# Patient Record
Sex: FEMALE | Race: BLACK OR AFRICAN AMERICAN | NOT HISPANIC OR LATINO | URBAN - METROPOLITAN AREA
[De-identification: names, ages, dates, MRNs, and addresses within clinical notes are randomized per-mention and may not be internally consistent; named-entity substitution may affect disease eponyms.]

---

## 2017-08-17 ENCOUNTER — EMERGENCY (EMERGENCY)
Facility: HOSPITAL | Age: 67
LOS: 1 days | Discharge: HOME | End: 2017-08-17
Attending: EMERGENCY MEDICINE | Admitting: EMERGENCY MEDICINE
Payer: MEDICARE

## 2017-08-17 VITALS
OXYGEN SATURATION: 100 % | SYSTOLIC BLOOD PRESSURE: 180 MMHG | RESPIRATION RATE: 20 BRPM | TEMPERATURE: 98 F | DIASTOLIC BLOOD PRESSURE: 100 MMHG | HEART RATE: 88 BPM

## 2017-08-17 VITALS
HEART RATE: 64 BPM | SYSTOLIC BLOOD PRESSURE: 161 MMHG | DIASTOLIC BLOOD PRESSURE: 104 MMHG | RESPIRATION RATE: 16 BRPM | OXYGEN SATURATION: 100 %

## 2017-08-17 DIAGNOSIS — Z95.0 PRESENCE OF CARDIAC PACEMAKER: Chronic | ICD-10-CM

## 2017-08-17 LAB
ALBUMIN SERPL ELPH-MCNC: 4.6 G/DL — SIGNIFICANT CHANGE UP (ref 3.3–5)
ALP SERPL-CCNC: 106 U/L — SIGNIFICANT CHANGE UP (ref 40–120)
ALT FLD-CCNC: 13 U/L — SIGNIFICANT CHANGE UP (ref 4–33)
AMPHET UR-MCNC: NEGATIVE — SIGNIFICANT CHANGE UP
APAP SERPL-MCNC: < 15 UG/ML — LOW (ref 15–25)
APPEARANCE UR: CLEAR — SIGNIFICANT CHANGE UP
AST SERPL-CCNC: 17 U/L — SIGNIFICANT CHANGE UP (ref 4–32)
BARBITURATES MEASUREMENT: NEGATIVE — SIGNIFICANT CHANGE UP
BARBITURATES UR SCN-MCNC: NEGATIVE — SIGNIFICANT CHANGE UP
BASOPHILS # BLD AUTO: 0.03 K/UL — SIGNIFICANT CHANGE UP (ref 0–0.2)
BASOPHILS NFR BLD AUTO: 0.4 % — SIGNIFICANT CHANGE UP (ref 0–2)
BENZODIAZ SERPL-MCNC: NEGATIVE — SIGNIFICANT CHANGE UP
BENZODIAZ UR-MCNC: NEGATIVE — SIGNIFICANT CHANGE UP
BILIRUB SERPL-MCNC: 0.7 MG/DL — SIGNIFICANT CHANGE UP (ref 0.2–1.2)
BILIRUB UR-MCNC: NEGATIVE — SIGNIFICANT CHANGE UP
BLOOD UR QL VISUAL: NEGATIVE — SIGNIFICANT CHANGE UP
BUN SERPL-MCNC: 8 MG/DL — SIGNIFICANT CHANGE UP (ref 7–23)
CALCIUM SERPL-MCNC: 10.9 MG/DL — HIGH (ref 8.4–10.5)
CANNABINOIDS UR-MCNC: NEGATIVE — SIGNIFICANT CHANGE UP
CHLORIDE SERPL-SCNC: 103 MMOL/L — SIGNIFICANT CHANGE UP (ref 98–107)
CO2 SERPL-SCNC: 27 MMOL/L — SIGNIFICANT CHANGE UP (ref 22–31)
COCAINE METAB.OTHER UR-MCNC: NEGATIVE — SIGNIFICANT CHANGE UP
COLOR SPEC: SIGNIFICANT CHANGE UP
CREAT SERPL-MCNC: 0.79 MG/DL — SIGNIFICANT CHANGE UP (ref 0.5–1.3)
EOSINOPHIL # BLD AUTO: 0.18 K/UL — SIGNIFICANT CHANGE UP (ref 0–0.5)
EOSINOPHIL NFR BLD AUTO: 2.2 % — SIGNIFICANT CHANGE UP (ref 0–6)
ETHANOL BLD-MCNC: < 10 MG/DL — SIGNIFICANT CHANGE UP
GLUCOSE SERPL-MCNC: 96 MG/DL — SIGNIFICANT CHANGE UP (ref 70–99)
GLUCOSE UR-MCNC: NEGATIVE — SIGNIFICANT CHANGE UP
HCT VFR BLD CALC: 42.8 % — SIGNIFICANT CHANGE UP (ref 34.5–45)
HGB BLD-MCNC: 13.1 G/DL — SIGNIFICANT CHANGE UP (ref 11.5–15.5)
IMM GRANULOCYTES # BLD AUTO: 0.03 # — SIGNIFICANT CHANGE UP
IMM GRANULOCYTES NFR BLD AUTO: 0.4 % — SIGNIFICANT CHANGE UP (ref 0–1.5)
KETONES UR-MCNC: NEGATIVE — SIGNIFICANT CHANGE UP
LEUKOCYTE ESTERASE UR-ACNC: NEGATIVE — SIGNIFICANT CHANGE UP
LYMPHOCYTES # BLD AUTO: 2.37 K/UL — SIGNIFICANT CHANGE UP (ref 1–3.3)
LYMPHOCYTES # BLD AUTO: 28.7 % — SIGNIFICANT CHANGE UP (ref 13–44)
MCHC RBC-ENTMCNC: 27.8 PG — SIGNIFICANT CHANGE UP (ref 27–34)
MCHC RBC-ENTMCNC: 30.6 % — LOW (ref 32–36)
MCV RBC AUTO: 90.9 FL — SIGNIFICANT CHANGE UP (ref 80–100)
METHADONE UR-MCNC: NEGATIVE — SIGNIFICANT CHANGE UP
MONOCYTES # BLD AUTO: 0.53 K/UL — SIGNIFICANT CHANGE UP (ref 0–0.9)
MONOCYTES NFR BLD AUTO: 6.4 % — SIGNIFICANT CHANGE UP (ref 2–14)
MUCOUS THREADS # UR AUTO: SIGNIFICANT CHANGE UP
NEUTROPHILS # BLD AUTO: 5.12 K/UL — SIGNIFICANT CHANGE UP (ref 1.8–7.4)
NEUTROPHILS NFR BLD AUTO: 61.9 % — SIGNIFICANT CHANGE UP (ref 43–77)
NITRITE UR-MCNC: NEGATIVE — SIGNIFICANT CHANGE UP
NRBC # FLD: 0.02 — SIGNIFICANT CHANGE UP
OPIATES UR-MCNC: NEGATIVE — SIGNIFICANT CHANGE UP
OXYCODONE UR-MCNC: NEGATIVE — SIGNIFICANT CHANGE UP
PCP UR-MCNC: NEGATIVE — SIGNIFICANT CHANGE UP
PH UR: 7 — SIGNIFICANT CHANGE UP (ref 4.6–8)
PLATELET # BLD AUTO: 193 K/UL — SIGNIFICANT CHANGE UP (ref 150–400)
PMV BLD: 9.6 FL — SIGNIFICANT CHANGE UP (ref 7–13)
POTASSIUM SERPL-MCNC: 4.7 MMOL/L — SIGNIFICANT CHANGE UP (ref 3.5–5.3)
POTASSIUM SERPL-SCNC: 4.7 MMOL/L — SIGNIFICANT CHANGE UP (ref 3.5–5.3)
PROT SERPL-MCNC: 8.3 G/DL — SIGNIFICANT CHANGE UP (ref 6–8.3)
PROT UR-MCNC: NEGATIVE — SIGNIFICANT CHANGE UP
RBC # BLD: 4.71 M/UL — SIGNIFICANT CHANGE UP (ref 3.8–5.2)
RBC # FLD: 14.2 % — SIGNIFICANT CHANGE UP (ref 10.3–14.5)
RBC CASTS # UR COMP ASSIST: SIGNIFICANT CHANGE UP (ref 0–?)
SALICYLATES SERPL-MCNC: < 5 MG/DL — LOW (ref 15–30)
SODIUM SERPL-SCNC: 143 MMOL/L — SIGNIFICANT CHANGE UP (ref 135–145)
SP GR SPEC: 1.01 — SIGNIFICANT CHANGE UP (ref 1–1.03)
SQUAMOUS # UR AUTO: SIGNIFICANT CHANGE UP
TSH SERPL-MCNC: 1.36 UIU/ML — SIGNIFICANT CHANGE UP (ref 0.27–4.2)
UROBILINOGEN FLD QL: NORMAL E.U. — SIGNIFICANT CHANGE UP (ref 0.1–0.2)
WBC # BLD: 8.26 K/UL — SIGNIFICANT CHANGE UP (ref 3.8–10.5)
WBC # FLD AUTO: 8.26 K/UL — SIGNIFICANT CHANGE UP (ref 3.8–10.5)
WBC UR QL: SIGNIFICANT CHANGE UP (ref 0–?)

## 2017-08-17 PROCEDURE — 99285 EMERGENCY DEPT VISIT HI MDM: CPT | Mod: 25

## 2017-08-17 PROCEDURE — 93010 ELECTROCARDIOGRAM REPORT: CPT

## 2017-08-17 PROCEDURE — 90792 PSYCH DIAG EVAL W/MED SRVCS: CPT

## 2017-08-17 NOTE — ED BEHAVIORAL HEALTH ASSESSMENT NOTE - HPI (INCLUDE ILLNESS QUALITY, SEVERITY, DURATION, TIMING, CONTEXT, MODIFYING FACTORS, ASSOCIATED SIGNS AND SYMPTOMS)
This is a 66 year old  -Amercian male This is a 66 year old  -Amercian male, domiciled in an [facility] supportive apartment This is a 66 year old  -Amercian male, domiciled in an [facility] supportive apartment in [place], history of multiple past psychiatric hospitalizations most recently discharged from [facility] in June 2017 where patient received ECT treatment during inpatient course, history of past suicide attempt 30 years ago with past history of suicidal ideation, engaged in outpatient treatment at [facility] with reported medication adherance, history of Lymphoma, HTN, pacemaker, umbilical hernia is brought to the ER by ambulance called by [facility] staff This is a 66 year old  -Amercian male, domiciled in an [facility] supportive apartment in [place], history of multiple past psychiatric hospitalizations most recently discharged from [facility] in June 2017 where patient received ECT treatment during inpatient course, history of past suicide attempt 30 years ago with past history of suicidal ideation, engaged in outpatient treatment at [facility] with reported medication adherance, history of Lymphoma, HTN, pacemaker, umbilical hernia is brought to the ER by ambulance called by [facility] staff due to suicidal ideation.  Patient does endorse long history of chronic depressive symptoms and reports episodic passive suicidal ideation stating "there are days I feel this way." This is a 66 year old  -Amercian male, domiciled in an [facility] supportive apartment in [place], history of multiple past psychiatric hospitalizations most recently discharged from [facility] in June 2017 where patient received ECT treatment during inpatient course, history of past suicide attempt 30 years ago with past history of suicidal ideation, engaged in outpatient treatment at [facility] with reported medication adherance, history of Lymphoma, HTN, pacemaker, umbilical hernia is brought to the ER by ambulance called by [facility] staff due to suicidal ideation.  Patient does endorse long history of chronic depressive symptoms and reports episodic passive suicidal ideation stating "there are days I feel this way."    See  note as completed by Brooklyn Mauricio from social work for collateral and additional information obtained. This is a 66 year old  -Amercian male, domiciled in an [facility] supportive apartment in [place], history of multiple past psychiatric hospitalizations most recently discharged from [facility] in June 2017 where patient received ECT treatment during inpatient course, history of one past suicide attempt 30 years ago with past history of suicidal ideation, engaged in outpatient treatment at [facility] with reported medication adherance, history of Lymphoma, HTN, pacemaker, umbilical hernia is brought to the ER by ambulance called by [facility] staff due to suicidal ideation.  Patient does endorse long history of chronic depressive symptoms and reports episodic passive suicidal ideation stating "there are days I feel this way," but denies acute suicidal ideation, either active or passive at this time.  Patient does endorse depressed mood with low energy, anhedonia, low interest but denies feelings of hopelessness or helplessness at this time.  Patient reports fair appetite with good sleep with patient reporting benefit from Trazodone and sleeping approximately 7 hours per night.  Patient feeling anger directed at being sent to the ER for evaluation after scheduled appointment with  stating, "ironically, today was one of my better days."  Patient endorses multiple psychosocial stressors including getting adjusted to new living environment and roommate with patient moving into supportive apartment residence about 5 weeks ago.  Additionally patient express frustration with her stove in apartment not working and financial stressors stemming from medicaid issues and paying her rent.  Patient reports she attempts to be consistent with a daily routine schedule and also utilizes journaling as coping strategies which she reports to have been helpful.  Patient is future oriented and states she enjoys seeing her son and granddaughter on weekends and reports her sister is also a positive support for her.  Patient with a reported history of alcohol abuse in the past but denies any recent alcohol use or illicit substance use or abuse.  No homicidal ideation or violent thoughts, no acute anxiety symptoms or panic attacks, no evidence of irritability, no acute agitation, no manic/hypomanic symptoms, no lability, no AH, no VH, no paranoia, no delusions, no evidence of PTSD or OCD symptoms.  Patient reports medication adherance with psychiatric medications and treatment and able to contract for safety at this time.  Patient does not present as an acute risk to self or others at this time.    See  note as completed by Brooklyn Mauricio from social work for collateral and additional information obtained. This is a 66 year old   Amercian female, domiciled in an [facility] supportive apartment in [place], history of multiple past psychiatric hospitalizations most recently discharged from [facility] in June 2017 where patient received ECT treatment during inpatient course, history of one past suicide attempt 30 years ago with past history of suicidal ideation, engaged in outpatient treatment at [facility] with reported medication adherance, history of Hodgkin's Lymphoma, HTN, GERD, pacemaker, umbilical hernia is brought to the ER by ambulance called by [facility] staff due to suicidal ideation.  Patient does endorse long history of chronic depressive symptoms and reports episodic passive suicidal ideation stating "there are days I feel this way," but denies acute suicidal ideation, either active or passive at this time.  Patient does endorse depressed mood with low energy, anhedonia, low interest but denies feelings of hopelessness or helplessness at this time.  Patient reports fair appetite with good sleep with patient reporting benefit from Trazodone and sleeping approximately 7 hours per night.  Patient expresses feeling anger directed at being sent to the ER for evaluation after scheduled appointment with  stating, "ironically, today was one of my better days."  Patient endorses multiple psychosocial stressors including getting adjusted to new living environment and roommate with patient moving into supportive apartment residence about 5 weeks ago.  Additionally patient express frustration with her stove in the apartment not working and financial stressors stemming from medicaid issues and paying her rent.  Patient reports she attempts to be consistent with a daily routine schedule and also utilizes journaling as coping strategies which she reports to have been helpful.  Patient is future oriented and states she enjoys seeing her son and granddaughter on weekends and reports her sister is also a positive support for her which are significant protective factors.  Patient with a reported history of alcohol abuse in the past but denies any recent alcohol use or illicit substance use or abuse with toxicology screens noted to be negative.  No homicidal ideation or violent thoughts, no acute anxiety symptoms or panic attacks, no evidence of irritability, no acute agitation, no manic/hypomanic symptoms, no lability, no AH, no VH, no paranoia, no delusions, no evidence of PTSD or OCD symptoms.  Patient reports medication adherance with psychiatric medications and treatment and able to contract for safety at this time.  Patient does not present as an acute risk to self or others at this time with no acute signs or symptoms of dangerousness noted.    See  note as completed by Brooklyn Mauricio from social work for collateral and additional information obtained.

## 2017-08-17 NOTE — ED BEHAVIORAL HEALTH ASSESSMENT NOTE - DESCRIPTION
calm, cooperative, no psychomotor abnormalities calm, cooperative, no psychomotor abnormalities, no prns required during ED course Hodgkin's Lymphoma by history, HTN, GERD, hx of post-inflammatory pulmonary fibrosis, hx of blood clots Born and raised in [place], high school graduate with one year of college,  15 years ago, has 2 sons, estranged from 38yo but close relationship with 33yo son, 12 yo granddaughter who lives in [place], [place] calm, cooperative, pleasant, no psychomotor abnormalities, no prns required during ED course

## 2017-08-17 NOTE — ED PROVIDER NOTE - PROGRESS NOTE DETAILS
Elaine: s/o Dr. Ascencio, pt with depression, has a h/o ppm so s/o pending ecg. he has no concern for intox. labs normal. ecg is paced w/o any intox changes.  med clear.

## 2017-08-17 NOTE — ED ADULT NURSE NOTE - OBJECTIVE STATEMENT
pt received in , c/o depression, pt verbalizes occasional feelings of sadness, as per pt "I told my  I don't have plans of hurting myself, just that sometimes when I wake up in the morning I wish I didn't wake up"  pt denies SI,HI&AH, denies ETOH and substance use. A&Ox3. Awaiting psych eval.
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
## 2017-08-17 NOTE — ED PROVIDER NOTE - MEDICAL DECISION MAKING DETAILS
66 hx of depression, pending medical clearance, psych will eval, s/o to dr moore check labs, dispo accordingly

## 2017-08-17 NOTE — ED BEHAVIORAL HEALTH ASSESSMENT NOTE - MEDICATIONS (PRESCRIPTIONS, DIRECTIONS)
Continue medications as prescribed, no changes, adherance is advised and encouraged, patient expresses agreement with stated plan.

## 2017-08-17 NOTE — ED BEHAVIORAL HEALTH ASSESSMENT NOTE - SUMMARY
This is a 66 year old  -Amercian male, domiciled in an [facility] supportive apartment in [place], history of multiple past psychiatric hospitalizations most recently discharged from [facility] in June 2017 where patient received ECT treatment during inpatient course, history of one past suicide attempt 30 years ago with past history of suicidal ideation, engaged in outpatient treatment at [facility] with reported medication adherance, history of Lymphoma, HTN, pacemaker, umbilical hernia is brought to the ER by ambulance called by [facility] staff due to suicidal ideation.  Patient does endorse long history of chronic depressive symptoms and reports episodic passive suicidal ideation stating "there are days I feel this way," but denies acute suicidal ideation, either active or passive at this time.  Patient does endorse depressed mood with low energy, anhedonia, low interest but denies feelings of hopelessness or helplessness at this time.  Patient reports fair appetite with good sleep with patient reporting benefit from Trazodone and sleeping approximately 7 hours per night.  Patient feeling anger directed at being sent to the ER for evaluation after scheduled appointment with  stating, "ironically, today was one of my better days."  Patient endorses multiple psychosocial stressors including getting adjusted to new living environment and roommate with patient moving into supportive apartment residence about 5 weeks ago.  Additionally patient express frustration with her stove in apartment not working and financial stressors stemming from medicaid issues and paying her rent.  Patient reports she attempts to be consistent with a daily routine schedule and also utilizes journaling as coping strategies which she reports to have been helpful.  Patient is future oriented and states she enjoys seeing her son and granddaughter on weekends and reports her sister is also a positive support for her.  Patient with a reported history of alcohol abuse in the past but denies any recent alcohol use or illicit substance use or abuse.  No homicidal ideation or violent thoughts, no acute anxiety symptoms or panic attacks, no evidence of irritability, no acute agitation, no manic/hypomanic symptoms, no lability, no AH, no VH, no paranoia, no delusions, no evidence of PTSD or OCD symptoms.  Patient reports medication adherance with psychiatric medications and treatment and able to contract for safety at this time.  Patient does not present as an acute risk to self or others at this time. This is a 66 year old  -Amercian female, domiciled in an [facility] supportive apartment in [place], history of multiple past psychiatric hospitalizations most recently discharged from [facility] in June 2017, history of one past suicide attempt 30 years ago with past history of suicidal ideation, engaged in outpatient treatment at [facility] with reported medication adherance, history of Lymphoma, HTN, pacemaker, umbilical hernia is brought to the ER by ambulance called by [facility] staff due to suicidal ideation.  Patient does endorse long history of chronic depressive symptoms and reports episodic passive suicidal ideation but denies acute suicidal ideation, either active or passive at this time.  Patient does endorse depressed mood with low energy, anhedonia, low interest but denies feelings of hopelessness or helplessness at this time.  Patient reports fair appetite with good sleep with patient reporting benefit from Trazodone.  Patient expresses feeling anger directed at being sent to the ER for evaluation but no overt irritability or agitation is evident. Patient endorses multiple psychosocial stressors including financial, getting adjusted to new living environment and roommate with patient moving into supportive apartment residence about 5 weeks ago.  Patient reports she attempts to be consistent with a daily routine schedule and also utilizes journaling as coping strategies which she reports to have been helpful.  Patient is future oriented and states she enjoys seeing her son and granddaughter on weekends and reports her sister is also a positive support for her.  Patient with a reported history of alcohol abuse but denies any recent alcohol use or illicit substance use or abuse.  No homicidal ideation or violent thoughts, no acute anxiety symptoms or panic attacks, no evidence of irritability, no acute agitation, no manic/hypomanic symptoms, no lability, no AH, no VH, no paranoia, no delusions, no evidence of PTSD or OCD symptoms.  Patient reports medication adherance with psychiatric medications and treatment and able to contract for safety at this time.  Patient does not present as an acute risk to self or others at this time and is noted to be fully cooperative and disclosing, open and expressive with the evaluation performed.  There is no evidence of evasiveness exhibited during the course of the evaluation.

## 2017-08-17 NOTE — ED BEHAVIORAL HEALTH ASSESSMENT NOTE - SUICIDE PROTECTIVE FACTORS
Supportive social network or family/Future oriented/Identifies reasons for living/Positive therapeutic relationships

## 2017-08-17 NOTE — ED PROVIDER NOTE - CHPI ED SYMPTOMS NEG
no weakness/no hallucinations/no change in level of consciousness/no homicidal/no confusion/no suicidal/no disorientation/no agitation/no paranoia

## 2017-08-17 NOTE — ED ADULT TRIAGE NOTE - CHIEF COMPLAINT QUOTE
Pt sent in by her  because she states that when she wakes  up in the morning she wishes that she would not. Pt denies being suicidal in triage  and is upset she was sent in her. Pt with bipolar and depression .  is  anat andino

## 2017-08-17 NOTE — ED PROVIDER NOTE - OBJECTIVE STATEMENT
67 yo female w hx of depression , on zoloft, wellbutrin and trazodone, as well as another psych med, was talking to  today, told her feels depressed, going through "lots of stuff"  new apartment, stove not working,  called ems.  pt denies SI HI delusion hallucinations.  previously had ECT  hx of htn ppm sp bradycardia, hld

## 2017-08-17 NOTE — ED BEHAVIORAL HEALTH ASSESSMENT NOTE - OTHER
roommate Tiffany Chaudhary,  staff from [facility] supportive apartment program Provided support, reassurance and psychoeducation.

## 2017-08-17 NOTE — ED BEHAVIORAL HEALTH ASSESSMENT NOTE - NS ED BHA PLAN TR BH CONTACTED FT
call and message left for Dr. Bowers, outpatient psychiatrist, but not available directly as after hours

## 2017-08-17 NOTE — ED BEHAVIORAL HEALTH ASSESSMENT NOTE - RISK ASSESSMENT
Chronic risk factors are noted including history of Major depressive disorder with chronic depressive symptoms and past suicidal ideation, one past suicide attempt in the past, multiple past psychiatric hospitalizations.  Precipitating factors seem to be recent change in residence and financial stressors.  Protective factors include positive therapeutic relationships, supportive relationships with family and future oriented thinking related to seeing granddaughter in the future and reported medication adherance.  Patient represents a chronic risk to self but as there is no acute SI, there is no evidence of an acute risk to self or others.  Patient is offered voluntary psychiatric hospitalization and declines, does not meet criteria for involuntary psychiatric retention as per parameters of mental health hygiene law 9.39.

## 2017-08-17 NOTE — ED BEHAVIORAL HEALTH ASSESSMENT NOTE - SAFETY PLAN DETAILS
Call 911 or immediately return to ER if patient should endorse suicidal ideation or feel self to be a risk to self or others.  Patient advised to contact staff immediately as well and agrees.

## 2017-08-17 NOTE — ED BEHAVIORAL HEALTH ASSESSMENT NOTE - PATIENT'S CHIEF COMPLAINT
"There are days I have thoughts I wish I wouldn't wake up but today was actually was one of my better days."
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
## 2017-08-17 NOTE — ED BEHAVIORAL HEALTH ASSESSMENT NOTE - OTHER PAST PSYCHIATRIC HISTORY (INCLUDE DETAILS REGARDING ONSET, COURSE OF ILLNESS, INPATIENT/OUTPATIENT TREATMENT)
History of Major Depressive Disorder with outpatient treatment at [facility] outpatient clinic since 2012, seeing Dr. Bowers, outpatient psychiatrist for the last 2-3 years, no current therapist in place.  3 past inpatient psychiatric hospitalizations at [facility], first in 2009 and most recently discharged in June 2017 after 2 month course with first ECT treatment given during that time.  One past history of suicide attempt by slitting wrists 30 years, no history of self injurious behaviors.

## 2017-08-17 NOTE — ED BEHAVIORAL HEALTH ASSESSMENT NOTE - REFERRAL / APPOINTMENT DETAILS
follow up with outpatient psychiatrist, Dr. Bowers at [facility] with patient to contact for appointment tomorrow, care is coordinated with , Tiffany Chaudhary.  Patient provided [facility] crisis clinic brochure and advised of availability from Monday to Friday, 9am-7pm.

## 2017-08-18 DIAGNOSIS — R69 ILLNESS, UNSPECIFIED: ICD-10-CM

## 2017-08-18 DIAGNOSIS — F33.9 MAJOR DEPRESSIVE DISORDER, RECURRENT, UNSPECIFIED: ICD-10-CM

## 2023-12-15 NOTE — ED ADULT NURSE NOTE - NS ED NURSE DISCH DISPOSITION
Completed and placed in the [facility] fax bin.
Faxed.
Left a message on Theresa's voicemail to call back to the nurse line to discuss the [facility] paperwork that her  dropped off.
Noted. No soon availability unfortunately. If outpatient therapy office could just send me a brief note that she indeed started attending and the treatment plan, that would be great. I will be able to fill out the FMLA in that case. Most recent documentation was from a year ago and she was doing well at that time, so there wasn't anything recent on my end that appeared to support the need for continued FMLA.
Patient or  dropped off FMLA paperwork. Previously this was completed for postpartum depression. I last spoke to patient about this in December 2022. At that time her condition was significantly improved. We did not need to use any medications for mood. Unless there is been some recurrence, I do not see a medical reason that would support FMLA being continued at this time. If medical problem would return in the future I would fill this out again.
Received fax from [facility]. Scanned in to patient's chart.
Theresa called back and stated that she is experiencing chronic depression. She has been feeling hopeless and helpless and has had a few dark thoughts. She is currently being seen at [address] [place] [address]. She started there last week. She assumed that they sent you information on this. She would like to see you as soon as you have availability to discuss this and complete the [facility] Paperwork.
Discharged

## 2024-06-13 PROBLEM — E78.5 HYPERLIPIDEMIA, UNSPECIFIED: Chronic | Status: ACTIVE | Noted: 2017-08-17

## 2024-06-13 PROBLEM — I10 ESSENTIAL (PRIMARY) HYPERTENSION: Chronic | Status: ACTIVE | Noted: 2017-08-17

## 2024-06-14 VITALS
HEART RATE: 45 BPM | TEMPERATURE: 98 F | RESPIRATION RATE: 16 BRPM | WEIGHT: 211.64 LBS | OXYGEN SATURATION: 98 % | SYSTOLIC BLOOD PRESSURE: 170 MMHG | HEIGHT: 66 IN | DIASTOLIC BLOOD PRESSURE: 81 MMHG

## 2024-06-18 ENCOUNTER — INPATIENT (INPATIENT)
Facility: HOSPITAL | Age: 74
LOS: 0 days | Discharge: HOME | DRG: 322 | End: 2024-06-19
Attending: INTERNAL MEDICINE | Admitting: INTERNAL MEDICINE
Payer: COMMERCIAL

## 2024-06-18 DIAGNOSIS — Z90.710 ACQUIRED ABSENCE OF BOTH CERVIX AND UTERUS: Chronic | ICD-10-CM

## 2024-06-18 DIAGNOSIS — Z98.890 OTHER SPECIFIED POSTPROCEDURAL STATES: Chronic | ICD-10-CM

## 2024-06-18 DIAGNOSIS — Z95.0 PRESENCE OF CARDIAC PACEMAKER: Chronic | ICD-10-CM

## 2024-06-18 LAB
A1C WITH ESTIMATED AVERAGE GLUCOSE RESULT: 5.2 % — SIGNIFICANT CHANGE UP (ref 4–5.6)
ALBUMIN SERPL ELPH-MCNC: 4.2 G/DL — SIGNIFICANT CHANGE UP (ref 3.3–5)
ALP SERPL-CCNC: 102 U/L — SIGNIFICANT CHANGE UP (ref 40–120)
ALT FLD-CCNC: 9 U/L — LOW (ref 10–45)
ANION GAP SERPL CALC-SCNC: 11 MMOL/L — SIGNIFICANT CHANGE UP (ref 5–17)
APTT BLD: 31.7 SEC — SIGNIFICANT CHANGE UP (ref 24.5–35.6)
AST SERPL-CCNC: 18 U/L — SIGNIFICANT CHANGE UP (ref 10–40)
BASOPHILS # BLD AUTO: 0.04 K/UL — SIGNIFICANT CHANGE UP (ref 0–0.2)
BASOPHILS NFR BLD AUTO: 0.5 % — SIGNIFICANT CHANGE UP (ref 0–2)
BILIRUB SERPL-MCNC: 0.7 MG/DL — SIGNIFICANT CHANGE UP (ref 0.2–1.2)
BUN SERPL-MCNC: 14 MG/DL — SIGNIFICANT CHANGE UP (ref 7–23)
CALCIUM SERPL-MCNC: 10.8 MG/DL — HIGH (ref 8.4–10.5)
CHLORIDE SERPL-SCNC: 103 MMOL/L — SIGNIFICANT CHANGE UP (ref 96–108)
CHOLEST SERPL-MCNC: 134 MG/DL — SIGNIFICANT CHANGE UP
CK MB CFR SERPL CALC: 2.3 NG/ML — SIGNIFICANT CHANGE UP (ref 0–6.7)
CK SERPL-CCNC: 96 U/L — SIGNIFICANT CHANGE UP (ref 25–170)
CO2 SERPL-SCNC: 27 MMOL/L — SIGNIFICANT CHANGE UP (ref 22–31)
CREAT SERPL-MCNC: 0.88 MG/DL — SIGNIFICANT CHANGE UP (ref 0.5–1.3)
EGFR: 70 ML/MIN/1.73M2 — SIGNIFICANT CHANGE UP
EOSINOPHIL # BLD AUTO: 0.51 K/UL — HIGH (ref 0–0.5)
EOSINOPHIL NFR BLD AUTO: 6 % — SIGNIFICANT CHANGE UP (ref 0–6)
ESTIMATED AVERAGE GLUCOSE: 103 MG/DL — SIGNIFICANT CHANGE UP (ref 68–114)
GLUCOSE SERPL-MCNC: 95 MG/DL — SIGNIFICANT CHANGE UP (ref 70–99)
HCT VFR BLD CALC: 36.9 % — SIGNIFICANT CHANGE UP (ref 34.5–45)
HDLC SERPL-MCNC: 60 MG/DL — SIGNIFICANT CHANGE UP
HGB BLD-MCNC: 11.4 G/DL — LOW (ref 11.5–15.5)
IMM GRANULOCYTES NFR BLD AUTO: 0.2 % — SIGNIFICANT CHANGE UP (ref 0–0.9)
INR BLD: 1.03 — SIGNIFICANT CHANGE UP (ref 0.85–1.18)
LIPID PNL WITH DIRECT LDL SERPL: 62 MG/DL — SIGNIFICANT CHANGE UP
LYMPHOCYTES # BLD AUTO: 1.88 K/UL — SIGNIFICANT CHANGE UP (ref 1–3.3)
LYMPHOCYTES # BLD AUTO: 22 % — SIGNIFICANT CHANGE UP (ref 13–44)
MAGNESIUM SERPL-MCNC: 1.6 MG/DL — SIGNIFICANT CHANGE UP (ref 1.6–2.6)
MCHC RBC-ENTMCNC: 28.2 PG — SIGNIFICANT CHANGE UP (ref 27–34)
MCHC RBC-ENTMCNC: 30.9 GM/DL — LOW (ref 32–36)
MCV RBC AUTO: 91.3 FL — SIGNIFICANT CHANGE UP (ref 80–100)
MONOCYTES # BLD AUTO: 0.73 K/UL — SIGNIFICANT CHANGE UP (ref 0–0.9)
MONOCYTES NFR BLD AUTO: 8.6 % — SIGNIFICANT CHANGE UP (ref 2–14)
NEUTROPHILS # BLD AUTO: 5.35 K/UL — SIGNIFICANT CHANGE UP (ref 1.8–7.4)
NEUTROPHILS NFR BLD AUTO: 62.7 % — SIGNIFICANT CHANGE UP (ref 43–77)
NON HDL CHOLESTEROL: 74 MG/DL — SIGNIFICANT CHANGE UP
NRBC # BLD: 0 /100 WBCS — SIGNIFICANT CHANGE UP (ref 0–0)
PLATELET # BLD AUTO: 175 K/UL — SIGNIFICANT CHANGE UP (ref 150–400)
POTASSIUM SERPL-MCNC: 4.6 MMOL/L — SIGNIFICANT CHANGE UP (ref 3.5–5.3)
POTASSIUM SERPL-SCNC: 4.6 MMOL/L — SIGNIFICANT CHANGE UP (ref 3.5–5.3)
PROT SERPL-MCNC: 7.9 G/DL — SIGNIFICANT CHANGE UP (ref 6–8.3)
PROTHROM AB SERPL-ACNC: 11.7 SEC — SIGNIFICANT CHANGE UP (ref 9.5–13)
RBC # BLD: 4.04 M/UL — SIGNIFICANT CHANGE UP (ref 3.8–5.2)
RBC # FLD: 14.7 % — HIGH (ref 10.3–14.5)
SODIUM SERPL-SCNC: 141 MMOL/L — SIGNIFICANT CHANGE UP (ref 135–145)
TRIGL SERPL-MCNC: 57 MG/DL — SIGNIFICANT CHANGE UP
WBC # BLD: 8.53 K/UL — SIGNIFICANT CHANGE UP (ref 3.8–10.5)
WBC # FLD AUTO: 8.53 K/UL — SIGNIFICANT CHANGE UP (ref 3.8–10.5)

## 2024-06-18 PROCEDURE — 99152 MOD SED SAME PHYS/QHP 5/>YRS: CPT

## 2024-06-18 PROCEDURE — 92978 ENDOLUMINL IVUS OCT C 1ST: CPT | Mod: 26,LD

## 2024-06-18 PROCEDURE — 93010 ELECTROCARDIOGRAM REPORT: CPT | Mod: 76

## 2024-06-18 PROCEDURE — 92933 PRQ TRLML C ATHRC ST ANGIOP1: CPT | Mod: LD

## 2024-06-18 RX ORDER — TRAZODONE HYDROCHLORIDE 50 MG/1
50 TABLET, FILM COATED ORAL AT BEDTIME
Refills: 0 | Status: DISCONTINUED | OUTPATIENT
Start: 2024-06-18 | End: 2024-06-19

## 2024-06-18 RX ORDER — CARIPRAZINE 3 MG/1
1 CAPSULE, GELATIN COATED ORAL
Refills: 0 | DISCHARGE

## 2024-06-18 RX ORDER — DEXTROMETHORPHAN HYDROBROMIDE, BUPROPION HYDROCHLORIDE 105; 45 MG/1; MG/1
1 TABLET, MULTILAYER, EXTENDED RELEASE ORAL
Refills: 0 | DISCHARGE

## 2024-06-18 RX ORDER — TRAZODONE HYDROCHLORIDE 50 MG/1
1 TABLET, FILM COATED ORAL
Refills: 0 | DISCHARGE

## 2024-06-18 RX ORDER — LAMOTRIGINE 100 MG/1
1 TABLET ORAL
Refills: 0 | DISCHARGE

## 2024-06-18 RX ORDER — ATORVASTATIN CALCIUM 20 MG/1
40 TABLET, FILM COATED ORAL AT BEDTIME
Refills: 0 | Status: DISCONTINUED | OUTPATIENT
Start: 2024-06-18 | End: 2024-06-19

## 2024-06-18 RX ORDER — MORPHINE SULFATE 100 MG/1
1 TABLET, EXTENDED RELEASE ORAL ONCE
Refills: 0 | Status: DISCONTINUED | OUTPATIENT
Start: 2024-06-18 | End: 2024-06-18

## 2024-06-18 RX ORDER — ASPIRIN 325 MG/1
81 TABLET, FILM COATED ORAL DAILY
Refills: 0 | Status: DISCONTINUED | OUTPATIENT
Start: 2024-06-19 | End: 2024-06-19

## 2024-06-18 RX ORDER — CLOPIDOGREL BISULFATE 75 MG/1
75 TABLET, FILM COATED ORAL DAILY
Refills: 0 | Status: DISCONTINUED | OUTPATIENT
Start: 2024-06-19 | End: 2024-06-19

## 2024-06-18 RX ORDER — TRAZODONE HYDROCHLORIDE 50 MG/1
0 TABLET, FILM COATED ORAL
Refills: 0 | DISCHARGE

## 2024-06-18 RX ORDER — SERTRALINE HYDROCHLORIDE 100 MG/1
1 TABLET, FILM COATED ORAL
Refills: 0 | DISCHARGE

## 2024-06-18 RX ORDER — SODIUM CHLORIDE 0.9 % (FLUSH) 0.9 %
1000 SYRINGE (ML) INJECTION
Refills: 0 | Status: DISCONTINUED | OUTPATIENT
Start: 2024-06-18 | End: 2024-06-19

## 2024-06-18 RX ORDER — MAGNESIUM SULFATE 100 %
2 POWDER (GRAM) MISCELLANEOUS ONCE
Refills: 0 | Status: COMPLETED | OUTPATIENT
Start: 2024-06-18 | End: 2024-06-18

## 2024-06-18 RX ORDER — LISINOPRIL 5 MG/1
1 TABLET ORAL
Refills: 0 | DISCHARGE

## 2024-06-18 RX ORDER — ASPIRIN 325 MG/1
81 TABLET, FILM COATED ORAL ONCE
Refills: 0 | Status: COMPLETED | OUTPATIENT
Start: 2024-06-18 | End: 2024-06-18

## 2024-06-18 RX ORDER — DULOXETINE HYDROCHLORIDE 20 MG/1
30 CAPSULE, DELAYED RELEASE ORAL DAILY
Refills: 0 | Status: DISCONTINUED | OUTPATIENT
Start: 2024-06-18 | End: 2024-06-19

## 2024-06-18 RX ORDER — LOSARTAN POTASSIUM 100 MG/1
1 TABLET, FILM COATED ORAL
Refills: 0 | DISCHARGE

## 2024-06-18 RX ORDER — SODIUM CHLORIDE 0.9 % (FLUSH) 0.9 %
1000 SYRINGE (ML) INJECTION
Refills: 0 | Status: DISCONTINUED | OUTPATIENT
Start: 2024-06-18 | End: 2024-06-18

## 2024-06-18 RX ORDER — FUROSEMIDE 10 MG/ML
1 INJECTION, SOLUTION INTRAMUSCULAR; INTRAVENOUS
Refills: 0 | DISCHARGE

## 2024-06-18 RX ORDER — SODIUM CHLORIDE 0.9 % (FLUSH) 0.9 %
250 SYRINGE (ML) INJECTION ONCE
Refills: 0 | Status: COMPLETED | OUTPATIENT
Start: 2024-06-18 | End: 2024-06-18

## 2024-06-18 RX ORDER — FLUTICASONE PROPIONATE 44 MCG
1 AEROSOL WITH ADAPTER (GRAM) INHALATION
Refills: 0 | DISCHARGE

## 2024-06-18 RX ORDER — CLOPIDOGREL BISULFATE 75 MG/1
600 TABLET, FILM COATED ORAL ONCE
Refills: 0 | Status: COMPLETED | OUTPATIENT
Start: 2024-06-18 | End: 2024-06-18

## 2024-06-18 RX ORDER — SODIUM CHLORIDE 0.9 % (FLUSH) 0.9 %
250 SYRINGE (ML) INJECTION ONCE
Refills: 0 | Status: DISCONTINUED | OUTPATIENT
Start: 2024-06-18 | End: 2024-06-18

## 2024-06-18 RX ORDER — DULOXETINE HYDROCHLORIDE 20 MG/1
1 CAPSULE, DELAYED RELEASE ORAL
Refills: 0 | DISCHARGE

## 2024-06-18 RX ORDER — ARIPIPRAZOLE 15 MG/1
1 TABLET ORAL
Refills: 0 | DISCHARGE

## 2024-06-18 RX ORDER — CLOPIDOGREL BISULFATE 75 MG/1
75 TABLET, FILM COATED ORAL ONCE
Refills: 0 | Status: DISCONTINUED | OUTPATIENT
Start: 2024-06-18 | End: 2024-06-18

## 2024-06-18 RX ORDER — ACETAMINOPHEN 325 MG
650 TABLET ORAL EVERY 6 HOURS
Refills: 0 | Status: DISCONTINUED | OUTPATIENT
Start: 2024-06-18 | End: 2024-06-19

## 2024-06-18 RX ORDER — FUROSEMIDE 10 MG/ML
40 INJECTION, SOLUTION INTRAMUSCULAR; INTRAVENOUS DAILY
Refills: 0 | Status: DISCONTINUED | OUTPATIENT
Start: 2024-06-19 | End: 2024-06-19

## 2024-06-18 RX ORDER — LAMOTRIGINE 100 MG/1
25 TABLET ORAL DAILY
Refills: 0 | Status: DISCONTINUED | OUTPATIENT
Start: 2024-06-19 | End: 2024-06-19

## 2024-06-18 RX ORDER — CARVEDILOL PHOSPHATE 80 MG/1
3.12 CAPSULE, EXTENDED RELEASE ORAL EVERY 12 HOURS
Refills: 0 | Status: DISCONTINUED | OUTPATIENT
Start: 2024-06-18 | End: 2024-06-19

## 2024-06-18 RX ORDER — LISINOPRIL 5 MG/1
10 TABLET ORAL DAILY
Refills: 0 | Status: DISCONTINUED | OUTPATIENT
Start: 2024-06-18 | End: 2024-06-19

## 2024-06-18 RX ORDER — BUPROPION HYDROCHLORIDE 150 MG/1
1 TABLET, EXTENDED RELEASE ORAL
Refills: 0 | DISCHARGE

## 2024-06-18 RX ADMIN — CARVEDILOL PHOSPHATE 3.12 MILLIGRAM(S): 80 CAPSULE, EXTENDED RELEASE ORAL at 18:24

## 2024-06-18 RX ADMIN — ATORVASTATIN CALCIUM 40 MILLIGRAM(S): 20 TABLET, FILM COATED ORAL at 21:45

## 2024-06-18 RX ADMIN — DULOXETINE HYDROCHLORIDE 30 MILLIGRAM(S): 20 CAPSULE, DELAYED RELEASE ORAL at 21:44

## 2024-06-18 RX ADMIN — CLOPIDOGREL BISULFATE 600 MILLIGRAM(S): 75 TABLET, FILM COATED ORAL at 12:45

## 2024-06-18 RX ADMIN — ASPIRIN 81 MILLIGRAM(S): 325 TABLET, FILM COATED ORAL at 12:45

## 2024-06-18 RX ADMIN — Medication 650 MILLIGRAM(S): at 23:45

## 2024-06-18 RX ADMIN — LISINOPRIL 10 MILLIGRAM(S): 5 TABLET ORAL at 18:24

## 2024-06-18 RX ADMIN — MORPHINE SULFATE 1 MILLIGRAM(S): 100 TABLET, EXTENDED RELEASE ORAL at 17:12

## 2024-06-18 RX ADMIN — Medication 650 MILLIGRAM(S): at 22:51

## 2024-06-18 RX ADMIN — Medication 50 MILLILITER(S): at 12:48

## 2024-06-18 RX ADMIN — Medication 250 MILLILITER(S): at 16:54

## 2024-06-18 RX ADMIN — Medication 250 MILLILITER(S): at 12:48

## 2024-06-18 RX ADMIN — Medication 25 GRAM(S): at 12:45

## 2024-06-19 VITALS
DIASTOLIC BLOOD PRESSURE: 99 MMHG | OXYGEN SATURATION: 99 % | TEMPERATURE: 98 F | HEART RATE: 67 BPM | RESPIRATION RATE: 17 BRPM | SYSTOLIC BLOOD PRESSURE: 154 MMHG

## 2024-06-19 LAB
ANION GAP SERPL CALC-SCNC: 9 MMOL/L — SIGNIFICANT CHANGE UP (ref 5–17)
BUN SERPL-MCNC: 10 MG/DL — SIGNIFICANT CHANGE UP (ref 7–23)
CALCIUM SERPL-MCNC: 10.4 MG/DL — SIGNIFICANT CHANGE UP (ref 8.4–10.5)
CHLORIDE SERPL-SCNC: 104 MMOL/L — SIGNIFICANT CHANGE UP (ref 96–108)
CO2 SERPL-SCNC: 24 MMOL/L — SIGNIFICANT CHANGE UP (ref 22–31)
CREAT SERPL-MCNC: 0.75 MG/DL — SIGNIFICANT CHANGE UP (ref 0.5–1.3)
EGFR: 85 ML/MIN/1.73M2 — SIGNIFICANT CHANGE UP
GLUCOSE SERPL-MCNC: 94 MG/DL — SIGNIFICANT CHANGE UP (ref 70–99)
HCT VFR BLD CALC: 35.2 % — SIGNIFICANT CHANGE UP (ref 34.5–45)
HGB BLD-MCNC: 10.9 G/DL — LOW (ref 11.5–15.5)
MAGNESIUM SERPL-MCNC: 1.7 MG/DL — SIGNIFICANT CHANGE UP (ref 1.6–2.6)
MCHC RBC-ENTMCNC: 28.3 PG — SIGNIFICANT CHANGE UP (ref 27–34)
MCHC RBC-ENTMCNC: 31 GM/DL — LOW (ref 32–36)
MCV RBC AUTO: 91.4 FL — SIGNIFICANT CHANGE UP (ref 80–100)
NRBC # BLD: 0 /100 WBCS — SIGNIFICANT CHANGE UP (ref 0–0)
PLATELET # BLD AUTO: 160 K/UL — SIGNIFICANT CHANGE UP (ref 150–400)
POTASSIUM SERPL-MCNC: 4.2 MMOL/L — SIGNIFICANT CHANGE UP (ref 3.5–5.3)
POTASSIUM SERPL-SCNC: 4.2 MMOL/L — SIGNIFICANT CHANGE UP (ref 3.5–5.3)
RBC # BLD: 3.85 M/UL — SIGNIFICANT CHANGE UP (ref 3.8–5.2)
RBC # FLD: 14.9 % — HIGH (ref 10.3–14.5)
SODIUM SERPL-SCNC: 137 MMOL/L — SIGNIFICANT CHANGE UP (ref 135–145)
WBC # BLD: 8.78 K/UL — SIGNIFICANT CHANGE UP (ref 3.8–10.5)
WBC # FLD AUTO: 8.78 K/UL — SIGNIFICANT CHANGE UP (ref 3.8–10.5)

## 2024-06-19 PROCEDURE — 80053 COMPREHEN METABOLIC PANEL: CPT

## 2024-06-19 PROCEDURE — C1769: CPT

## 2024-06-19 PROCEDURE — C1894: CPT

## 2024-06-19 PROCEDURE — 83036 HEMOGLOBIN GLYCOSYLATED A1C: CPT

## 2024-06-19 PROCEDURE — 85610 PROTHROMBIN TIME: CPT

## 2024-06-19 PROCEDURE — 82553 CREATINE MB FRACTION: CPT

## 2024-06-19 PROCEDURE — C1760: CPT

## 2024-06-19 PROCEDURE — 93005 ELECTROCARDIOGRAM TRACING: CPT

## 2024-06-19 PROCEDURE — 80061 LIPID PANEL: CPT

## 2024-06-19 PROCEDURE — 85025 COMPLETE CBC W/AUTO DIFF WBC: CPT

## 2024-06-19 PROCEDURE — 82550 ASSAY OF CK (CPK): CPT

## 2024-06-19 PROCEDURE — 36415 COLL VENOUS BLD VENIPUNCTURE: CPT

## 2024-06-19 PROCEDURE — 80048 BASIC METABOLIC PNL TOTAL CA: CPT

## 2024-06-19 PROCEDURE — 85730 THROMBOPLASTIN TIME PARTIAL: CPT

## 2024-06-19 PROCEDURE — C1725: CPT

## 2024-06-19 PROCEDURE — 83735 ASSAY OF MAGNESIUM: CPT

## 2024-06-19 PROCEDURE — 85347 COAGULATION TIME ACTIVATED: CPT

## 2024-06-19 PROCEDURE — 99239 HOSP IP/OBS DSCHRG MGMT >30: CPT

## 2024-06-19 PROCEDURE — C1724: CPT

## 2024-06-19 PROCEDURE — C1753: CPT

## 2024-06-19 PROCEDURE — 85027 COMPLETE CBC AUTOMATED: CPT

## 2024-06-19 PROCEDURE — C1887: CPT

## 2024-06-19 PROCEDURE — C1874: CPT

## 2024-06-19 PROCEDURE — 93010 ELECTROCARDIOGRAM REPORT: CPT

## 2024-06-19 RX ORDER — CLOPIDOGREL BISULFATE 75 MG/1
1 TABLET, FILM COATED ORAL
Qty: 30 | Refills: 0
Start: 2024-06-19 | End: 2024-07-18

## 2024-06-19 RX ORDER — ASPIRIN 325 MG/1
1 TABLET, FILM COATED ORAL
Qty: 30 | Refills: 0
Start: 2024-06-19 | End: 2024-07-18

## 2024-06-19 RX ORDER — ATORVASTATIN CALCIUM 20 MG/1
1 TABLET, FILM COATED ORAL
Qty: 0 | Refills: 0 | DISCHARGE
Start: 2024-06-19

## 2024-06-19 RX ORDER — CARVEDILOL PHOSPHATE 80 MG/1
1 CAPSULE, EXTENDED RELEASE ORAL
Qty: 60 | Refills: 0
Start: 2024-06-19 | End: 2024-07-18

## 2024-06-19 RX ORDER — ASPIRIN 325 MG/1
1 TABLET, FILM COATED ORAL
Refills: 0 | DISCHARGE

## 2024-06-19 RX ORDER — ATORVASTATIN CALCIUM 20 MG/1
1 TABLET, FILM COATED ORAL
Refills: 0 | DISCHARGE

## 2024-06-19 RX ORDER — CLOPIDOGREL BISULFATE 75 MG/1
1 TABLET, FILM COATED ORAL
Refills: 0 | DISCHARGE

## 2024-06-19 RX ORDER — ATORVASTATIN CALCIUM 20 MG/1
1 TABLET, FILM COATED ORAL
Qty: 30 | Refills: 0
Start: 2024-06-19 | End: 2024-07-18

## 2024-06-19 RX ORDER — MAGNESIUM OXIDE 400 MG/1
800 TABLET ORAL ONCE
Refills: 0 | Status: DISCONTINUED | OUTPATIENT
Start: 2024-06-19 | End: 2024-06-19

## 2024-06-19 RX ORDER — ASPIRIN 325 MG/1
1 TABLET, FILM COATED ORAL
Qty: 30 | Refills: 10
Start: 2024-06-19 | End: 2025-05-14

## 2024-06-19 RX ORDER — CLOPIDOGREL BISULFATE 75 MG/1
1 TABLET, FILM COATED ORAL
Qty: 30 | Refills: 10
Start: 2024-06-19 | End: 2025-05-14

## 2024-06-19 RX ADMIN — LISINOPRIL 10 MILLIGRAM(S): 5 TABLET ORAL at 05:53

## 2024-06-19 RX ADMIN — CLOPIDOGREL BISULFATE 75 MILLIGRAM(S): 75 TABLET, FILM COATED ORAL at 11:21

## 2024-06-19 RX ADMIN — DULOXETINE HYDROCHLORIDE 30 MILLIGRAM(S): 20 CAPSULE, DELAYED RELEASE ORAL at 11:21

## 2024-06-19 RX ADMIN — CARVEDILOL PHOSPHATE 3.12 MILLIGRAM(S): 80 CAPSULE, EXTENDED RELEASE ORAL at 05:53

## 2024-06-19 RX ADMIN — FUROSEMIDE 40 MILLIGRAM(S): 10 INJECTION, SOLUTION INTRAMUSCULAR; INTRAVENOUS at 05:53

## 2024-06-19 RX ADMIN — ASPIRIN 81 MILLIGRAM(S): 325 TABLET, FILM COATED ORAL at 11:21

## 2024-06-21 DIAGNOSIS — F32.9 MAJOR DEPRESSIVE DISORDER, SINGLE EPISODE, UNSPECIFIED: ICD-10-CM

## 2024-06-21 DIAGNOSIS — I25.118 ATHEROSCLEROTIC HEART DISEASE OF NATIVE CORONARY ARTERY WITH OTHER FORMS OF ANGINA PECTORIS: ICD-10-CM

## 2024-06-21 DIAGNOSIS — Z85.72 PERSONAL HISTORY OF NON-HODGKIN LYMPHOMAS: ICD-10-CM

## 2024-06-21 DIAGNOSIS — I25.84 CORONARY ATHEROSCLEROSIS DUE TO CALCIFIED CORONARY LESION: ICD-10-CM

## 2024-06-21 DIAGNOSIS — Z98.61 CORONARY ANGIOPLASTY STATUS: ICD-10-CM

## 2024-06-21 DIAGNOSIS — E78.5 HYPERLIPIDEMIA, UNSPECIFIED: ICD-10-CM

## 2024-06-21 DIAGNOSIS — I49.5 SICK SINUS SYNDROME: ICD-10-CM

## 2024-06-21 DIAGNOSIS — I10 ESSENTIAL (PRIMARY) HYPERTENSION: ICD-10-CM

## 2024-06-21 DIAGNOSIS — Z88.2 ALLERGY STATUS TO SULFONAMIDES: ICD-10-CM

## 2024-06-21 DIAGNOSIS — Z79.02 LONG TERM (CURRENT) USE OF ANTITHROMBOTICS/ANTIPLATELETS: ICD-10-CM

## 2024-06-21 DIAGNOSIS — Z95.0 PRESENCE OF CARDIAC PACEMAKER: ICD-10-CM

## 2024-06-21 DIAGNOSIS — Z79.82 LONG TERM (CURRENT) USE OF ASPIRIN: ICD-10-CM

## 2024-06-25 LAB
ISTAT ACTK (ACTIVATED CLOTTING TIME KAOLIN): 255 SEC — HIGH (ref 74–137)
ISTAT ACTK (ACTIVATED CLOTTING TIME KAOLIN): 260 SEC — HIGH (ref 74–137)